# Patient Record
Sex: FEMALE
[De-identification: names, ages, dates, MRNs, and addresses within clinical notes are randomized per-mention and may not be internally consistent; named-entity substitution may affect disease eponyms.]

---

## 2018-02-26 ENCOUNTER — HOSPITAL ENCOUNTER (EMERGENCY)
Dept: HOSPITAL 42 - ED | Age: 28
Discharge: HOME | End: 2018-02-26
Payer: MEDICAID

## 2018-02-26 VITALS — BODY MASS INDEX: 32.8 KG/M2

## 2018-02-26 VITALS — TEMPERATURE: 98.3 F | SYSTOLIC BLOOD PRESSURE: 122 MMHG | HEART RATE: 99 BPM | DIASTOLIC BLOOD PRESSURE: 76 MMHG

## 2018-02-26 VITALS — OXYGEN SATURATION: 99 % | RESPIRATION RATE: 19 BRPM

## 2018-02-26 DIAGNOSIS — M54.5: Primary | ICD-10-CM

## 2018-02-26 LAB
APPEARANCE UR: (no result)
BILIRUB UR-MCNC: NEGATIVE MG/DL
COLOR UR: YELLOW
GLUCOSE UR STRIP-MCNC: NEGATIVE MG/DL
HCG,QUALITATIVE URINE: NEGATIVE
LEUKOCYTE ESTERASE UR-ACNC: NEGATIVE LEU/UL
PH UR STRIP: 6 [PH] (ref 4.7–8)
PROT UR STRIP-MCNC: NEGATIVE MG/DL
RBC # UR STRIP: NEGATIVE /UL
SP GR UR STRIP: >= 1.03 (ref 1–1.03)
URINE NITRATE: NEGATIVE
UROBILINOGEN UR STRIP-ACNC: 0.2 E.U./DL

## 2018-02-26 NOTE — ED PDOC
Arrival/HPI





- General


Chief Complaint: Back Pain


Time Seen by Provider: 02/26/18 19:25


Historian: Patient





- History of Present Illness


Narrative History of Present Illness (Text): 


02/26/18 19:53


A 27 year old female presents to the emergency department complaining of left 

flank pain, worsening the last three days. Patient reports pain is chronic, 

similar symptoms over last 3 years. Patient denies any hematuria, fever or any 

other complaints at this time.





02/26/18 22:35





Symptom Onset: Sudden


Symptom Course: Unchanged


Activities at Onset: Rest


Context: Home





Past Medical History





- Provider Review


Nursing Documentation Reviewed: Yes





- Past History


Past History: No Previous





- Infectious Disease


Hx of Infectious Diseases: None





- Tetanus Immunization


Tetanus Immunization: Unknown





- Psychiatric


Hx Depression: No


Hx Emotional Abuse: No


Hx Physical Abuse: No


Hx Substance Use: No





- Past Surgical History


Past Surgical History: No Previous





- Anesthesia


Hx Anesthesia: No


Hx Anesthesia Reactions: No


Hx Malignant Hyperthermia: No





- Suicidal Assessment


Feels Threatened In Home Enviroment: No





Family/Social History





- Physician Review


Nursing Documentation Reviewed: Yes


Family/Social History: No Known Family HX


Smoking Status: Never Smoked


Hx Alcohol Use: No


Hx Substance Use: No





Allergies/Home Meds


Allergies/Adverse Reactions: 


Allergies





No Known Allergies Allergy (Verified 08/28/16 13:51)


 











Review of Systems





- Physician Review


All systems were reviewed & negative as marked: Yes





- Review of Systems


Constitutional: absent: Fevers


Genitourinary Female: absent: Hematuria


Musculoskeletal: Other (left flank pain)





Physical Exam


Vital Signs Reviewed: Yes


Vital Signs











  Temp Pulse Resp BP Pulse Ox


 


 02/26/18 20:32    19   99


 


 02/26/18 18:56  98.3 F  99 H  18  122/76  98











Temperature: Afebrile


Blood Pressure: Normal


Pulse: Regular


Respiratory Rate: Normal


Appearance: Positive for: Well-Appearing, Non-Toxic, Comfortable


Pain Distress: None


Mental Status: Positive for: Alert and Oriented X 3





- Systems Exam


Head: Present: Atraumatic, Normocephalic


Pupils: Present: PERRL


Extroacular Muscles: Present: EOMI


Conjunctiva: Present: Normal


Mouth: Present: Moist Mucous Membranes


Neck: Present: Normal Range of Motion


Respiratory/Chest: Present: Clear to Auscultation, Good Air Exchange.  No: 

Respiratory Distress, Accessory Muscle Use


Cardiovascular: Present: Regular Rate and Rhythm, Normal S1, S2.  No: Murmurs


Abdomen: Present: Normal Bowel Sounds.  No: Tenderness, Distention, Peritoneal 

Signs


Back: Present: Other (left flank tenderness)


Upper Extremity: Present: Normal Inspection.  No: Cyanosis, Edema


Lower Extremity: Present: Normal Inspection.  No: Edema


Neurological: Present: GCS=15, CN II-XII Intact, Speech Normal


Skin: Present: Warm, Dry, Normal Color.  No: Rashes


Psychiatric: Present: Alert, Oriented x 3, Normal Insight, Normal Concentration





Medical Decision Making


ED Course and Treatment: 


02/26/18 19:50


Impression:


A 27 year old female with left flank pain.





Plan:


-- Urinalysis


-- Reassess and disposition





Progress Notes:





02/26/18 22:35


pt reports had outpt neprhology eval and told "everything is ok". urine neg for 

blood infection, stone uti less lieklye. well pearing pain improved. pt states 

feels well for outpt management for continued diagnostic w/u





- Lab Interpretations


Lab Results: 


 Lab Results





02/26/18 19:55: Urine Color Yellow, Urine Appearance Sl cloudy, Urine pH 6.0, 

Ur Specific Gravity >= 1.030, Urine Protein Negative, Urine Glucose (UA) 

Negative, Urine Ketones Negative, Urine Blood Negative, Urine Nitrate Negative, 

Urine Bilirubin Negative, Urine Urobilinogen 0.2, Ur Leukocyte Esterase Negative

, Urine HCG, Qual Negative











- Medication Orders


Current Medication Orders: 











Discontinued Medications





Acetaminophen (Tylenol 325mg Tab)  975 mg PO STAT STA


   Stop: 02/26/18 19:49


   Last Admin: 02/26/18 20:00  Dose: 975 mg





MAR Pain/Vitals


 Document     02/26/18 20:00  [email or web address]  (Rec: 02/26/18 20:01  [email or web address]  [facility] [place]

OPERATOR1)


     Pain Reassessment


      Is This A Pain ReAssessment?               No


     Sleep


      Is patient sleeping during reassessment?   No


     Presence of Pain


      Presence of Pain                           Yes


     Pain Scale Used


      Pain Scale Used                            Numeric


     Location


      Pain Location Body Site                    Back


      Description                                Constant


      Intensity                                  6


      Scale Used                                 Numeric


      Pain Behavior                              Facial Grimacing


      Aggravating Factors                        Changing Position


      Alleviating Factors                        Medication














- Scribe Statement


The provider has reviewed the documentation as recorded by the Leoncioibyoav Rivas





Provider Scribe Attestation:


All medical record entries made by the Leoncioibyoav were at my direction and 

personally dictated by me. I have reviewed the chart and agree that the record 

accurately reflects my personal performance of the history, physical exam, 

medical decision making, and the department course for this patient. I have 

also personally directed, reviewed, and agree with the discharge instructions 

and disposition.











Disposition/Present on Arrival





- Present on Arrival


Any Indicators Present on Arrival: No


History of DVT/PE: No


History of Uncontrolled Diabetes: No


Urinary Catheter: No


History of Decub. Ulcer: No


History Surgical Site Infection Following: None





- Disposition


Have Diagnosis and Disposition been Completed?: Yes


Diagnosis: 


 Back pain





Disposition: HOME/ ROUTINE


Disposition Time: 08:00


Condition: STABLE


Discharge Instructions (ExitCare):  Low Back Pain  (DC)


Additional Instructions: 


please follow up with your doctor/ return to er with worsening symptoms or 

concerns. 


Prescriptions: 


Cyclobenzaprine [Cyclobenzaprine HCl] 10 mg PO DAILY PRN #10 tab


 PRN Reason: Muscle Spasm


Naproxen [Naprosyn] 500 mg PO BID PRN #14 tablet


 PRN Reason: Pain, Mild (1-3)


Referrals: 


PCP,NO [Primary Care Provider] - Follow up with primary


Forms:  [employer] (English)

## 2018-10-23 ENCOUNTER — HOSPITAL ENCOUNTER (EMERGENCY)
Dept: HOSPITAL 42 - ED | Age: 28
Discharge: HOME | End: 2018-10-23
Payer: COMMERCIAL

## 2018-10-23 VITALS — BODY MASS INDEX: 30.7 KG/M2

## 2018-10-23 VITALS — SYSTOLIC BLOOD PRESSURE: 120 MMHG | OXYGEN SATURATION: 97 % | DIASTOLIC BLOOD PRESSURE: 73 MMHG | HEART RATE: 75 BPM

## 2018-10-23 VITALS — TEMPERATURE: 98.3 F | RESPIRATION RATE: 18 BRPM

## 2018-10-23 DIAGNOSIS — Z23: ICD-10-CM
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
## 2018-10-23 DIAGNOSIS — S91.331A: Primary | ICD-10-CM

## 2018-10-23 NOTE — RAD
Date of service: 



10/23/2018



PROCEDURE:  Right Foot Radiographs.



HISTORY:

 rt. foot heel step on the nail 



COMPARISON:

None.



FINDINGS:



BONES:

No large fracture is identified or destructive bony lesion.  There is 

a small linear radiodensity seen approaching but not contiguous with 

the tubercle at the base of the calcaneus posteriorly and may reflect 

heterotopic calcification at the plantar fascia.  A tiny chip or 

avulsion fracture is difficult to completely exclude.  It is 

understood that the patient stepped on a nail.  Retained radiodense 

foreign body is possible though not favored.  No emphysema soft 

tissue changes are identified and no metallic retained foreign 

radiodense foreign body is appreciated definitively. 



JOINTS:

No subluxation or dislocation throughout the right foot.  Variable 

accessory ossicles are seen related to the cuboid bone and the 

navicular bone which are otherwise unremarkable. 



SOFT TISSUES:

Please see bone section above. 



OTHER FINDINGS:

None.



IMPRESSION:

Small linear radiodensity inferior to the posterior calcaneus 

inferiorly potentially reflecting heterotopic soft tissue 

calcification though tiny chip or avulsion fracture not completely 

excluded.   Small linear nonmetallic retained foreign body not 

completely excluded as well.  No definite metallic retained 

radiodense foreign body appreciable.



Findings discussed with DENVER Serna with written down read back 

verification 10/23/2018 12:50 p.m..

## 2018-10-23 NOTE — ED PDOC
Arrival/HPI





- General


Chief Complaint: Lower Extremity Problem/Injury


Time Seen by Provider: 10/23/18 10:50


Historian: Patient





- History of Present Illness


Narrative History of Present Illness (Text): 





10/23/18 11:01


26 y/o female, no significant pmh, nkda, last tetanus over 10 years ago, c/o rt.

foot step on the nail this morning about 1 hour ago.  pt. stated that she was in

her normal flat base rubber shoe, walking, accidentally step on the rt. foot 

heel region, was bleeding but resolved, no numbness or tingling, no difficulty 

moving the rt. foot/ankle/toes, no palpitation, no headache or night sweat, no 

rash, no dizziness, no other medical or psychological complaints. 





Past Medical History





- Provider Review


Nursing Documentation Reviewed: Yes





- Past History


Past History: No Previous





- Infectious Disease


Hx of Infectious Diseases: None





- Tetanus Immunization


Tetanus Immunization: Unknown





- Psychiatric


Hx Depression: No


Hx Emotional Abuse: No


Hx Physical Abuse: No


Hx Substance Use: No





- Past Surgical History


Past Surgical History: No Previous





- Anesthesia


Hx Anesthesia: No


Hx Anesthesia Reactions: No


Hx Malignant Hyperthermia: No





- Suicidal Assessment


Feels Threatened In Home Enviroment: No





Family/Social History





- Physician Review


Nursing Documentation Reviewed: Yes


Family/Social History: Unknown Family HX


Smoking Status: Never Smoked


Hx Alcohol Use: No


Hx Substance Use: No





Allergies/Home Meds


Allergies/Adverse Reactions: 


Allergies





No Known Allergies Allergy (Verified 08/28/16 13:51)


   











Review of Systems





- Review of Systems


Constitutional: absent: Fatigue, Fevers


Eyes: absent: Vision Changes


ENT: absent: Hearing Changes


Respiratory: absent: SOB, Cough


Cardiovascular: absent: Chest Pain


Gastrointestinal: absent: Abdominal Pain, Nausea, Vomiting


Musculoskeletal: absent: Arthralgias, Back Pain


Skin: Other (+rt. foot puncture wound).  absent: Rash, Pruritis, Skin Lesions, 

Abscess, Ulcer, Cellulitis





Physical Exam





- Systems Exam


Head: Present: Atraumatic, Normocephalic


Pupils: Present: PERRL


Extroacular Muscles: Present: EOMI


Conjunctiva: Present: Normal


Mouth: Present: Moist Mucous Membranes


Neck: Present: Normal Range of Motion


Respiratory/Chest: Present: Clear to Auscultation, Good Air Exchange.  No: 

Respiratory Distress, Accessory Muscle Use


Cardiovascular: Present: Regular Rate and Rhythm, Normal S1, S2.  No: Murmurs


Abdomen: No: Tenderness, Distention, Peritoneal Signs


Back: Present: Normal Inspection


Upper Extremity: Present: Normal Inspection.  No: Cyanosis, Edema


Lower Extremity: Present: Normal Inspection, Other (Rt. foot: sole laterally of 

the calcaneal region visible blood scab wound less than 0.2cm diameter with no 

active bleeding or hole noted, FROM without limitation, sensation intact, motor 

5/5, +DPPT pulses, capillary refill< 2 seconds, neurovascular intact. ).  No: 

Edema


Neurological: Present: GCS=15, CN II-XII Intact, Speech Normal


Skin: Present: Warm, Dry, Normal Color.  No: Rashes


Psychiatric: Present: Alert, Oriented x 3, Normal Insight, Normal Concentration





Medical Decision Making


ED Course and Treatment: 





10/23/18 11:14


-tdap


-urine hcg


-rt. foot xray


-observe and reassess





10/23/18 13:08


-Rt. foot xray show: Small linear radiodensity inferior to the posterior 

calcaneus inferiorly potentially reflecting heterotopic soft tissue 

calcification though tiny chip or avulsion fracture not completely excluded.   

Small linear nonmetallic retained foreign body not completely excluded as well. 

No definite metallic retained radiodense foreign body appreciable.


-Wound clean with saline 1000cc, clean with betadine, no visible foreign bodies 

and the patient doesn't feel any foreign bodies as the nail is clean, all labs 

and radiology results discussed with the patient, advised podiatrist follow up.


-I will place the patient on the ciprofloxacin for pseudomonas coverage, risk 

and side effects including but not limited to prolong QT and achilles tendon 

ruptures, pt. understood and agreed on the treatment plan.


-Discharge home with posterior splint, motrin, ciprofloxacin, bacitracin o

inment, crutches, avoid gym and exercise until you are clear by your own pmd and

podiatrist, non-weight bearing, follow up with your own pmd and podiatrist 

within 2 days, return to the ER for any new or worsening signs or symptoms. 





- RAD Interpretation


Radiology Orders: 





Date of service: 





10/23/2018





PROCEDURE:  Right Foot Radiographs.





HISTORY:


 rt. foot heel step on the nail 





COMPARISON:


None.





FINDINGS:





BONES:


No large fracture is identified or destructive bony lesion.  There is a small 

linear radiodensity seen approaching but not contiguous with the tubercle at the

base of the calcaneus posteriorly and may reflect heterotopic calcification at 

the plantar fascia.  A tiny chip or avulsion fracture is difficult to completely

exclude.  It is understood that the patient stepped on a nail.  Retained 

radiodense foreign body is possible though not favored.  No emphysema soft 

tissue changes are identified and no metallic retained foreign radiodense 

foreign body is appreciated definitively. 





JOINTS:


No subluxation or dislocation throughout the right foot.  Variable accessory 

ossicles are seen related to the cuboid bone and the navicular bone which are 

otherwise unremarkable. 





SOFT TISSUES:


Please see bone section above. 





OTHER FINDINGS:


None.





IMPRESSION:


Small linear radiodensity inferior to the posterior calcaneus inferiorly pot

entially reflecting heterotopic soft tissue calcification though tiny chip or 

avulsion fracture not completely excluded.   Small linear nonmetallic retained 

foreign body not completely excluded as well.  No definite metallic retained 

radiodense foreign body appreciable.


: Radiologist





- PA / NP / Resident Statement


MD/ has reviewed & agrees with the documentation as recorded.





Disposition/Present on Arrival





- Present on Arrival


Any Indicators Present on Arrival: No


History of DVT/PE: No


History of Uncontrolled Diabetes: No


Urinary Catheter: No


History of Decub. Ulcer: No


History Surgical Site Infection Following: None





- Disposition


Have Diagnosis and Disposition been Completed?: Yes


Diagnosis: 


 Puncture wound





Disposition: HOME/ ROUTINE


Disposition Time: 13:14


Patient Plan: Discharge


Condition: IMPROVED


Additional Instructions: 


-Discharge home with posterior splint, motrin, ciprofloxacin, bacitracin 

oinment, crutches, avoid gym and exercise until you are clear by your own pmd 

and podiatrist, non-weight bearing, follow up with your own pmd and podiatrist 

within 2 days, return to the ER for any new or worsening signs or symptoms. 


Prescriptions: 


Bacitracin Ointment [Bacitracin] 1 appful TOP BID #15 g


Ciprofloxacin [Cipro] 500 mg PO BID #14 tab


Ibuprofen [Motrin] 600 mg PO QID PRN #30 tab


 PRN Reason: Other


Referrals: 


Karlene Naqvi DO [Primary Care Provider] - Follow up with primary


Ashley Kessler DPM [Staff Provider] - Follow up with primary


Forms:  [employer] (English), WORK NOTE